# Patient Record
Sex: MALE | Race: WHITE | Employment: STUDENT | URBAN - NONMETROPOLITAN AREA
[De-identification: names, ages, dates, MRNs, and addresses within clinical notes are randomized per-mention and may not be internally consistent; named-entity substitution may affect disease eponyms.]

---

## 2024-11-18 ENCOUNTER — OFFICE VISIT (OUTPATIENT)
Age: 19
End: 2024-11-18

## 2024-11-18 VITALS
DIASTOLIC BLOOD PRESSURE: 72 MMHG | WEIGHT: 158 LBS | OXYGEN SATURATION: 97 % | SYSTOLIC BLOOD PRESSURE: 130 MMHG | HEART RATE: 72 BPM | RESPIRATION RATE: 16 BRPM | TEMPERATURE: 97 F

## 2024-11-18 DIAGNOSIS — Z87.09 HISTORY OF ASTHMA: ICD-10-CM

## 2024-11-18 DIAGNOSIS — J01.90 ACUTE NON-RECURRENT SINUSITIS, UNSPECIFIED LOCATION: Primary | ICD-10-CM

## 2024-11-18 LAB
Lab: NORMAL
QC PASS/FAIL: NORMAL
SARS-COV-2 RDRP RESP QL NAA+PROBE: NEGATIVE

## 2024-11-18 PROCEDURE — 87635 SARS-COV-2 COVID-19 AMP PRB: CPT | Performed by: NURSE PRACTITIONER

## 2024-11-18 PROCEDURE — 99203 OFFICE O/P NEW LOW 30 MIN: CPT | Performed by: NURSE PRACTITIONER

## 2024-11-18 RX ORDER — ALBUTEROL SULFATE 1.25 MG/3ML
1 SOLUTION RESPIRATORY (INHALATION) EVERY 6 HOURS PRN
COMMUNITY

## 2024-11-18 RX ORDER — DOXYCYCLINE HYCLATE 100 MG
100 TABLET ORAL EVERY 12 HOURS
Qty: 14 TABLET | Refills: 0 | Status: SHIPPED | OUTPATIENT
Start: 2024-11-18 | End: 2024-11-25

## 2024-11-18 RX ORDER — ALBUTEROL SULFATE 90 UG/1
2 INHALANT RESPIRATORY (INHALATION) 4 TIMES DAILY PRN
Qty: 18 G | Refills: 0 | Status: SHIPPED | OUTPATIENT
Start: 2024-11-18

## 2024-11-18 SDOH — ECONOMIC STABILITY: FOOD INSECURITY: WITHIN THE PAST 12 MONTHS, YOU WORRIED THAT YOUR FOOD WOULD RUN OUT BEFORE YOU GOT MONEY TO BUY MORE.: PATIENT DECLINED

## 2024-11-18 SDOH — ECONOMIC STABILITY: FOOD INSECURITY: WITHIN THE PAST 12 MONTHS, THE FOOD YOU BOUGHT JUST DIDN'T LAST AND YOU DIDN'T HAVE MONEY TO GET MORE.: PATIENT DECLINED

## 2024-11-18 SDOH — ECONOMIC STABILITY: INCOME INSECURITY: HOW HARD IS IT FOR YOU TO PAY FOR THE VERY BASICS LIKE FOOD, HOUSING, MEDICAL CARE, AND HEATING?: PATIENT DECLINED

## 2024-11-18 ASSESSMENT — ENCOUNTER SYMPTOMS
SPUTUM PRODUCTION: 1
SHORTNESS OF BREATH: 1
NAUSEA: 0
ABDOMINAL PAIN: 0
WHEEZING: 1
DIARRHEA: 0
VOMITING: 0
CHEST TIGHTNESS: 0
SORE THROAT: 0
COUGH: 1

## 2024-11-18 ASSESSMENT — PATIENT HEALTH QUESTIONNAIRE - PHQ9: DEPRESSION UNABLE TO ASSESS: PT REFUSES

## 2024-11-18 NOTE — PATIENT INSTRUCTIONS
Doxycycline can cause esophagitis which feels like heartburn- remain upright for 1 hour after taking a dose.  Doxycycline can also cause you to sunburn more easily- use extra caution and protection when out in the sun.

## 2024-11-18 NOTE — PROGRESS NOTES
[facility] [place] [facility]  [facility]  [address] [address]  Dept: [phone]  Loc: [phone]     Anuj Kim is a 19 y.o. male who presents today for:  Chief Complaint   Patient presents with    Cough     X 1 week       Chills     X 1 week     Fatigue     X 1 week     Asthma     Getting worse        Assessment/Plan:     1. Acute non-recurrent sinusitis, unspecified location  -covid neg.  Will rx antibiotic d/t no improvement in sxs after 1 week.  No recent antibiotics  -f/u if no improvement in 48-72 hours    2. History of asthma  -lungs CTA.  Respirations unlabored.  albuterol inhaler refilled.  Advised pt use inhaler every 4 hours x24-48 hours then PRN.  F/u if no improvement in SOB/wheezing in 48-72 hours or ASAP for any new or worsening sxs.       Results for orders placed or performed in visit on 11/18/24   POCT COVID-19 Rapid, NAAT   Result Value Ref Range    SARS-COV-2, RdRp gene Negative Negative    Lot Number [id number]     QC Pass/Fail pass         Medications Ordered:  Orders Placed This Encounter   Medications    doxycycline hyclate (VIBRA-TABS) 100 MG tablet     Sig: Take 1 tablet by mouth in the morning and 1 tablet in the evening. Do all this for 7 days.     Dispense:  14 tablet     Refill:  0    albuterol sulfate HFA (VENTOLIN HFA) 108 (90 Base) MCG/ACT inhaler     Sig: Inhale 2 puffs into the lungs 4 times daily as needed for Wheezing or Shortness of Breath     Dispense:  18 g     Refill:  0       No follow-ups on file.    No future appointments.    HPI:   Pt presents with cough, chills, fatigue, and congestion x1 week.  Sxs are not improving.  Has also had some SOB and wheezing over the last week- has h/o asthma.  Used albuterol inhaler yesterday which helped.  Denies fever and pleuritic pain.  Phlegm is yellow.    Cough  This is a new problem. The current episode started in the past 7 days. The problem has been unchanged.